# Patient Record
Sex: FEMALE | URBAN - METROPOLITAN AREA
[De-identification: names, ages, dates, MRNs, and addresses within clinical notes are randomized per-mention and may not be internally consistent; named-entity substitution may affect disease eponyms.]

---

## 2018-08-28 ENCOUNTER — EMPLOYEE HEALTH (OUTPATIENT)
Dept: OCCUPATIONAL MEDICINE | Age: 23
End: 2018-08-28
Attending: FAMILY MEDICINE

## 2018-09-05 ENCOUNTER — EMPLOYEE HEALTH (OUTPATIENT)
Dept: OCCUPATIONAL MEDICINE | Age: 23
End: 2018-09-05
Attending: PHYSICIAN ASSISTANT

## 2018-09-08 ENCOUNTER — EMPLOYEE HEALTH (OUTPATIENT)
Dept: OCCUPATIONAL MEDICINE | Age: 23
End: 2018-09-08
Attending: PREVENTIVE MEDICINE

## 2023-07-14 NOTE — PLAN OF CARE
Problem: Patient/Family Goals  Goal: Patient/Family Long Term Goal  Description: Patient's Long Term Goal: To have a healthy infant    Interventions:    - See additional Care Plan goals for specific interventions  Outcome: Progressing  Goal: Patient/Family Short Term Goal  Description: Patient's Short Term Goal: Uncomplicated vaginal delivery    Interventions:   - See additional Care Plan goals for specific interventions  Outcome: Progressing     Problem: BIRTH - VAGINAL/ SECTION  Goal: Fetal and maternal status remain reassuring during the birth process  Description: INTERVENTIONS:  - Monitor vital signs  - Monitor fetal heart rate  - Monitor uterine activity  - Monitor labor progression (vaginal delivery)  - DVT prophylaxis (C/S delivery)  - Surgical antibiotic prophylaxis (C/S delivery)  Outcome: Progressing     Problem: PAIN - ADULT  Goal: Verbalizes/displays adequate comfort level or patient's stated pain goal  Description: INTERVENTIONS:  - Encourage pt to monitor pain and request assistance  - Assess pain using appropriate pain scale  - Administer analgesics based on type and severity of pain and evaluate response  - Implement non-pharmacological measures as appropriate and evaluate response  - Consider cultural and social influences on pain and pain management  - Manage/alleviate anxiety  - Utilize distraction and/or relaxation techniques  - Monitor for opioid side effects  - Notify MD/LIP if interventions unsuccessful or patient reports new pain  - Anticipate increased pain with activity and pre-medicate as appropriate  Outcome: Progressing     Problem: ANXIETY  Goal: Will report anxiety at manageable levels  Description: INTERVENTIONS:  - Administer medication as ordered  - Teach and rehearse alternative coping skills  - Provide emotional support with 1:1 interaction with staff  Outcome: Progressing

## 2023-07-14 NOTE — PROGRESS NOTES
Patient admitted to room 106 for cytotec IOL at 44 & 2/7 weeks gestation. Plan of care discussed and questions answered. Patient reports + fetal movement. Denies contractions, cramping, bleeding or leaking fluid. Pain 0/10. Call light within reach.

## 2023-07-15 NOTE — ANESTHESIA PROCEDURE NOTES
Spinal Block    Date/Time: 7/15/2023 6:26 PM    Performed by: Primitivo Ortiz MD  Authorized by: Primitivo Ortiz MD      General Information and Staff    Start Time:  7/15/2023 6:26 PM  End Time:  7/15/2023 6:30 PM  Anesthesiologist:  Primitivo Ortiz MD  Performed by:   Anesthesiologist  Patient Location:  OB  Site identification: surface landmarks    Preanesthetic Checklist: patient identified, IV checked, risks and benefits discussed, monitors and equipment checked, pre-op evaluation, timeout performed, anesthesia consent and sterile technique used      Procedure Details    Patient Position:  Sitting  Prep: ChloraPrep    Monitoring:  Cardiac monitor, heart rate and continuous pulse ox  Approach:  Midline  Location:  L4-5  Injection Technique:  Single-shot    Needle    Needle Type:  Sprotte  Needle Gauge:  24 G  Needle Length:  3.5 in    Assessment    Sensory Level:   Events: clear CSF, CSF aspirated, well tolerated and blood negative      Additional Comments

## 2023-07-15 NOTE — PLAN OF CARE
Problem: Patient/Family Goals  Goal: Patient/Family Long Term Goal  Description: Patient's Long Term Goal: Uncomplicated vaginal delivery    Interventions:  VS per protocol  I&O  Ice chips and sips as tolerated  EFM per protocol  Maintain IV as ordered  Antibiotics as needed per protocol  Informed consent       - See additional Care Plan goals for specific interventions  Outcome: Progressing  Goal: Patient/Family Short Term Goal  Description: Patient's Short Term Goal: Adequate pain control with delivery of infant    Interventions:  Pain assessment scores as ordered  Patient scores pain a \"3\" or less  Multidisciplinary care   Nonpharmacologic comfort measures       - See additional Care Plan goals for specific interventions  Outcome: Progressing     Problem: BIRTH - VAGINAL/ SECTION  Goal: Fetal and maternal status remain reassuring during the birth process  Description: INTERVENTIONS:  - Monitor vital signs  - Monitor fetal heart rate  - Monitor uterine activity  - Monitor labor progression (vaginal delivery)  - DVT prophylaxis (C/S delivery)  - Surgical antibiotic prophylaxis (C/S delivery)  Outcome: Progressing     Problem: PAIN - ADULT  Goal: Verbalizes/displays adequate comfort level or patient's stated pain goal  Description: INTERVENTIONS:  - Encourage pt to monitor pain and request assistance  - Assess pain using appropriate pain scale  - Administer analgesics based on type and severity of pain and evaluate response  - Implement non-pharmacological measures as appropriate and evaluate response  - Consider cultural and social influences on pain and pain management  - Manage/alleviate anxiety  - Utilize distraction and/or relaxation techniques  - Monitor for opioid side effects  - Notify MD/LIP if interventions unsuccessful or patient reports new pain  - Anticipate increased pain with activity and pre-medicate as appropriate  Outcome: Progressing     Problem: ANXIETY  Goal: Will report anxiety at manageable levels  Description: INTERVENTIONS:  - Administer medication as ordered  - Teach and rehearse alternative coping skills  - Provide emotional support with 1:1 interaction with staff  Outcome: Progressing

## 2023-07-15 NOTE — PROGRESS NOTES
Code tornado protocol observed where patients are moved to the hallway. Fetal Monitor disconnected in the process of moving patient from room to hallway for safety between 2110 and 2118. Pt placed on the portable fetal monitor at this time.

## 2023-07-16 NOTE — OPERATIVE REPORT
Stephanie Ruiz Patient Status:  Inpatient    1995 MRN [id number]   Location [address] [place] [address] Attending Mandy Narvaez MD   Hosp Day # 2 PCP No primary care provider on file. Preop Dx:  failed IOL, fetal intolerance to labor  Postop Dx:  Same  Procedure: Primary Low transverse  section   Surgeon: Collins Ferreira M.D. Assistant: Ebony Gaffney  Anesthesia:   Spinal    Indications: This patient is a 28year old y/o  woman presenting for IOL. No cervical dilation since admission. PROM since 03:00 today. Had off and on late decells throughout the entire day. Decision made to proceed with c/s. The procedures , risks and complications were discussed with the patient including but not limited to infection, hemorrhage, blood transfusion, bowel bladder and ureteral injury, DVT and anesthetic risks. Her questions were answered. Procedure: The surgical assist, Dr. Giorgio Hester, was an integral part of the procedure. They were necessary to provide a safe outcome and to aid in adequate hemostasis. The patient was prepped and draped in a sterile fashion after satisfactory spinal anesthesia was given. A transverse skin incision was made with a scalpel and extended to the fascia. The fascia was incised in the midline with a scalpel then the incision extended laterally with a brooks scissors. The fascia was dissected from the muscles both inferiorly and superiorly with sharp and blunt dissection. The peritoneum was elevated in incised with a nohelia and extended superiorly and inferiorly with good visualization of the bladder , an Lucio retractor was placed in the abdominal cavity and opened. A bladder flap created. The uterus was incised partway through with a scalpel, entered bluntly with the tip of my finger and the uterine incision was extended bilaterally with cephalocaudal manual traction. Membranes were ruptured bluntly. The head was flexed and lifted to the incision. Delivered with aid of vacuum. then the mouth and nose were suctioned with a bulb syringe. The infant was delivered with fundal pressure, the cord clamped and cut, then the female infant taken to the warmer where Neonatology was in attendance. Cord blood was obtained. Cord gasses were obtained. The placenta was delivered with fundal massage and was normal.  The uterus was left in situ. The uterine cavity was cleaned and the incision closed in a running locking suture or number one chromic. Additional sutures of 2-0 chromic were placed for hemostasis as needed. The paracolic gutters were cleaned and the uterine incision inspected again for hemostasis. The edenilson retractor was removed. After noting excellent subfascial hemostasis, the fascia was closed with a running suture of #1 Vicryl. The sub Q was inspected, bleeders cauterized and it was closed with running suture of 2-0 plain suture. The skin closed with running  4-0 monocryl sub Q sutures and steri strips. The patient tolerated the procedure well with an 800 cc EBL and went to recovery in good condition.     Bailee Nova MD  07/15/23

## 2023-07-16 NOTE — PROGRESS NOTES
Pt admitted to MB unit. Admission assessment completed. Discharge education initiated with pt. Pt instructed on call light usage. Plan of care discussed with pt, pt verbalizes understanding.

## 2023-07-17 NOTE — CM/SW NOTE
met with patient [place]) to review insurance and PCP for infant. [facility] was called and asked to follow up with patient to do medicaid add on for infant. Patient PCP is Dr Andrew Raya in [facility]. Patient has [facility] services. Patient stated that she has Car seat, Crib, plans on breast feeding and has breast pump. Patient has used formula to meet infant needs.  Patient had no other questions or needs at this time

## 2023-07-17 NOTE — PROGRESS NOTES
Patient complaints: none. Pain controlled     Vital signs reviewed    Examination:  General: alert, appropriate affect  Abdomen: Fundus firm and no unexpected tenderness.   Remainder of abdomen unremarkable  Incision: dry, intact, no unexpected findings  Lochia: appropriate  Extremities: nontender, no excessive edema, symmetric    Recent Labs   Lab 23  0009 23  0656   RBC 3.70* 3.68*   HGB 9.8* 9.5*   HCT 30.0* 29.1*   MCV 81.1 79.1*   MCH 26.5 25.8*   MCHC 32.7 32.6   RDW 14.1 14.2   NEPRELIM 10.94* 18.29*   WBC 14.5* 20.7*   .0 266.0          Impression:   Postoperative day #2 from  birth, recuperating appropriately, anticipate routine discharge  Venofer for anemia

## 2023-07-18 NOTE — PROGRESS NOTES
NURSING DISCHARGE NOTE    Discharged Home via Wheelchair. Accompanied by Spouse  Belongings Taken by patient/family Verbalized good understanding of discharge instructions.

## 2023-07-18 NOTE — DISCHARGE SUMMARY
[facility] Discharge Summary    Date of admission:  2023 11:52 PM  Date of discharge:  2023  Admit diagnosis:  39w3d      Fetal intolerance to labor  Discharge diagnosis: Same     Status post  section  Hospital course:     Tiff Alan is a 28year old  at 39w3d, who presents for scheduled induction of labor. .  She is now status post uncomplicated primary  section for fetal intolerance to labor. Hospital course/postpartum recovery has been unremarkable.   Disposition:  Home  Condition at discharge: Stable, ambulatory  Activity:   Restricted, pelvic rest  Diet:   General  Discharge meds: Motrin prn  Tylenol prn  Gabapentin prn  Oxycodone 5 mg po q6h prn  Follow-up with MD: 2 and 6 wks

## 2023-07-24 NOTE — PAYOR COMM NOTE
--------------  DISCHARGE REVIEW    Payor: Leandro Dietz #:  [id number]  Authorization Number: David Malone date: 23  Admit time:  11:52 PM  Discharge Date: 2023  1:30 PM     Admitting Physician: Torin Matthews MD  Attending Physician:  No att. providers found  Primary Care Physician: No primary care provider on file. Discharge Summary Notes        Discharge Summary signed by Yomi Ballesteros MD at 2023  9:04 AM       Author: Yomi Ballesteros MD Specialty: OBSTETRICS & GYNECOLOGY Author Type: Physician    Filed: 2023  9:04 AM Date of Service: 2023  8:45 AM Status: Signed    : Yomi Ballesteros MD (Physician)         [facility] Discharge Summary    Date of admission:  2023 11:52 PM  Date of discharge:  2023  Admit diagnosis:  39w3d      Fetal intolerance to labor  Discharge diagnosis: Same     Status post  section  Hospital course:     Magdaleno Haney is a 28year old  at 38w3d, who presents for scheduled induction of labor. .  She is now status post uncomplicated primary  section for fetal intolerance to labor. Hospital course/postpartum recovery has been unremarkable. Disposition:  Home  Condition at discharge: Stable, ambulatory  Activity:   Restricted, pelvic rest  Diet:   General  Discharge meds: Motrin prn  Tylenol prn  Gabapentin prn  Oxycodone 5 mg po q6h prn  Follow-up with MD: 2 and 6 wks      Electronically signed by Yomi Ballesteros MD on 2023  9:04 AM               Magdaleno Haney Patient Status:  Inpatient    1995 MRN [id number]   Location [address] [place] [address] Attending Torin Matthews MD   Hosp Day # 2 PCP No primary care provider on file. Preop Dx:  failed IOL, fetal intolerance to labor  Postop Dx:  Same  Procedure: Primary Low transverse  section   Surgeon: Wing Abby JO Assistant: Maximiliano Medellin  Anesthesia:   Spinal     Indications:   This patient is a 28year old y/o  woman presenting for IOL. No cervical dilation since admission. PROM since 03:00 today. Had off and on late decells throughout the entire day. Decision made to proceed with c/s. The procedures , risks and complications were discussed with the patient including but not limited to infection, hemorrhage, blood transfusion, bowel bladder and ureteral injury, DVT and anesthetic risks. Her questions were answered. Procedure: The surgical assist, Dr. Hilma Frankel, was an integral part of the procedure. They were necessary to provide a safe outcome and to aid in adequate hemostasis. The patient was prepped and draped in a sterile fashion after satisfactory spinal anesthesia was given. A transverse skin incision was made with a scalpel and extended to the fascia. The fascia was incised in the midline with a scalpel then the incision extended laterally with a brooks scissors. The fascia was dissected from the muscles both inferiorly and superiorly with sharp and blunt dissection. The peritoneum was elevated in incised with a nohelia and extended superiorly and inferiorly with good visualization of the bladder , an Lucio retractor was placed in the abdominal cavity and opened. A bladder flap created. The uterus was incised partway through with a scalpel, entered bluntly with the tip of my finger and the uterine incision was extended bilaterally with cephalocaudal manual traction. Membranes were ruptured bluntly. The head was flexed and lifted to the incision. Delivered with aid of vacuum. then the mouth and nose were suctioned with a bulb syringe. The infant was delivered with fundal pressure, the cord clamped and cut, then the female infant taken to the warmer where Neonatology was in attendance. Cord blood was obtained. Cord gasses were obtained. The placenta was delivered with fundal massage and was normal.  The uterus was left in situ.   The uterine cavity was cleaned and the incision closed in a running locking suture or number one chromic. Additional sutures of 2-0 chromic were placed for hemostasis as needed. The paracolic gutters were cleaned and the uterine incision inspected again for hemostasis. The edenilson retractor was removed. After noting excellent subfascial hemostasis, the fascia was closed with a running suture of #1 Vicryl. The sub Q was inspected, bleeders cauterized and it was closed with running suture of 2-0 plain suture. The skin closed with running  4-0 monocryl sub Q sutures and steri strips. The patient tolerated the procedure well with an 800 cc EBL and went to recovery in good condition. Radha Edge MD  07/15/23            Electronically signed by Cody Alatorre MD at 7/15/2023  7:15 PM    Patient complaints: none. Pain controlled      Vital signs reviewed     Examination:  General: alert, appropriate affect  Abdomen: Fundus firm and no unexpected tenderness.   Remainder of abdomen unremarkable  Incision: dry, intact, no unexpected findings  Lochia: appropriate  Extremities: nontender, no excessive edema, symmetric          Recent Labs   Lab 23  0009 23  0656   RBC 3.70* 3.68*   HGB 9.8* 9.5*   HCT 30.0* 29.1*   MCV 81.1 79.1*   MCH 26.5 25.8*   MCHC 32.7 32.6   RDW 14.1 14.2   NEPRELIM 10.94* 18.29*   WBC 14.5* 20.7*   .0 266.0            Impression:   Postoperative day #2 from  birth, recuperating appropriately, anticipate routine discharge  Venofer for anemia               Electronically signed by Maylin Qureshi MD at 2023  9:51 AM